# Patient Record
Sex: FEMALE | Employment: OTHER | URBAN - METROPOLITAN AREA
[De-identification: names, ages, dates, MRNs, and addresses within clinical notes are randomized per-mention and may not be internally consistent; named-entity substitution may affect disease eponyms.]

---

## 2019-04-04 NOTE — PATIENT DISCUSSION
Cataracts Glasses Given: It was explained that when the vision no longer meets the patient's needs, and when a new prescription for glasses does not improve visual satisfaction, that the option of cataract surgery is a reasonable next step. It has been determined from manifest refraction and vision testing that a new prescription for glasses may help improve the visual symptoms somewhat but it is not known if they will provide vision that is satisfactory. A new prescription for glasses was provided to the patient and we will re-evaluate in 1 MONTH.

## 2020-10-07 NOTE — PATIENT DISCUSSION
MILD DRY EYES: PRESCRIBED ARTIFICIAL TEARS BID - QID AND WARM COMPRESSES OU RETURN FOR FOLLOW-UP AS SCHEDULED OR SOONER IF SYMPTOMS WORSEN.

## 2020-11-04 NOTE — PATIENT DISCUSSION
Surgery Counseling: I have discussed the option of scheduling surgery versus following, as well as the risks, benefits and alternatives of cataract surgery with the patient. It was explained that the surgery is medically indicated at this time, and it can be performed at the patient's option as delaying will cause no further deterioration, therefore there is no rush and there is no harm in waiting to have surgery. It was also explained that there is no guarantee that removing the cataract will improve their vision. The patient understands and desires to proceed with cataract surgery with the implantation of an intraocular lens to improve vision for ____reading and driving___. I have given the patient the prescribed regimen of the all-in-one drop to use before and after cataract surgery. They have elected to use the all-in-one option of Pred/Gati/Brom(prednisolone acetate,gatifloxacin,and bromfenac. Patient to administer as directed.

## 2020-11-04 NOTE — PATIENT DISCUSSION
CATARACTS, OS - VISUALLY SIGNIFICANT. SCHEDULE OS  DISCUSSED OPTION OF ___STANDARD OS___. PATIENT UNDERSTANDS AND DESIRES __STANDARD OS___.

## 2020-12-30 NOTE — PATIENT DISCUSSION
S/P PE IOL, ou. DOING WELL. CONTINUE DROPS AS DIRECTED. SPECS RX OFFERED. RX ARC IN SPECS TO MINIMIZE GLARE. RETURN FOR FOLLOW-UP AS SCHEDULED.

## 2021-01-25 ENCOUNTER — PREPPED CHART (OUTPATIENT)
Dept: URBAN - METROPOLITAN AREA CLINIC 39 | Facility: CLINIC | Age: 66
End: 2021-01-25

## 2022-01-14 NOTE — PATIENT DISCUSSION
Despite some risk factors, the patient does not demonstrate definitive evidence of glaucoma at this time.
Discussed the importance of blood sugar control in the prevention of ocular complications.
Follow with serial OCTs.
Instructed to call immediately if any new distortion, blurring, decreased vision or eye pain.
Monitor yearly for diabetic eye disease.
Monitor.
OCT ON OU today.
Patient advised to call if any problems, questions, or concerns.
Patient made aware of 24/7 emergency services.
Patient understands condition, prognosis and need for follow up care.
Recommended yearly dilated eye examinations.
Retinal exam findings communicated to Physician managing diabetes.
Retinal tear and detachment warning symptoms reviewed and patient instructed to call immediately if increasing floaters, flashes, or decreasing peripheral vision.
Stable, monitor.
Gastroenterology
Internal Medicine
Gastroenterology
Gastroenterology
Internal Medicine

## 2022-04-22 NOTE — PATIENT DISCUSSION
Glasses Prescribed: Stable on omeprazole 40 mg daily.  Refill sent.  She is following with Dr. Rosas Whitt at [facility] who wants to do a scope and barium swallow, but she is waiting a bit longer on that.  We did discuss today that it would be helpful to go ahead and get these procedures done.

## 2022-11-17 ENCOUNTER — EMERGENCY VISIT (OUTPATIENT)
Dept: URBAN - METROPOLITAN AREA CLINIC 39 | Facility: CLINIC | Age: 67
End: 2022-11-17

## 2022-11-17 DIAGNOSIS — Z96.1: ICD-10-CM

## 2022-11-17 DIAGNOSIS — Z98.890: ICD-10-CM

## 2022-11-17 PROCEDURE — 92012 INTRM OPH EXAM EST PATIENT: CPT

## 2022-11-17 ASSESSMENT — TONOMETRY
OS_IOP_MMHG: 20
OD_IOP_MMHG: 20

## 2022-11-17 ASSESSMENT — VISUAL ACUITY
OS_SC: 20/400
OD_SC: 20/20-1

## 2023-01-19 ENCOUNTER — CONSULTATION/EVALUATION (OUTPATIENT)
Dept: URBAN - METROPOLITAN AREA CLINIC 39 | Facility: CLINIC | Age: 68
End: 2023-01-19

## 2023-01-19 DIAGNOSIS — Z96.1: ICD-10-CM

## 2023-01-19 DIAGNOSIS — Z98.890: ICD-10-CM

## 2023-01-19 DIAGNOSIS — H25.812: ICD-10-CM

## 2023-01-19 PROCEDURE — 92136TC INTERFEROMETRY - TECHNICAL COMPONENT

## 2023-01-19 PROCEDURE — V2799PMN IMPRIMIS PRED-MOXI-NEPAF 5ML

## 2023-01-19 PROCEDURE — 92025 CPTRIZED CORNEAL TOPOGRAPHY: CPT

## 2023-01-19 PROCEDURE — 92014 COMPRE OPH EXAM EST PT 1/>: CPT

## 2023-01-19 ASSESSMENT — VISUAL ACUITY
OD_SC: 20/20-1
OD_SC: J1
OD_BAT: 20/30
OS_SC: 20/400
OS_SC: J8
OS_BAT: 20/70

## 2023-01-19 ASSESSMENT — TONOMETRY
OS_IOP_MMHG: 16
OD_IOP_MMHG: 17

## 2023-04-11 ASSESSMENT — VISUAL ACUITY: OS_AM: 20/60

## 2023-04-14 ENCOUNTER — CONSULTATION/EVALUATION (OUTPATIENT)
Dept: URBAN - METROPOLITAN AREA CLINIC 39 | Facility: CLINIC | Age: 68
End: 2023-04-14

## 2023-06-14 ENCOUNTER — SURGERY/PROCEDURE (OUTPATIENT)
Dept: URBAN - METROPOLITAN AREA SURGERY 14 | Facility: SURGERY | Age: 68
End: 2023-06-14

## 2023-06-14 ENCOUNTER — CONSULTATION/EVALUATION (OUTPATIENT)
Dept: URBAN - METROPOLITAN AREA CLINIC 39 | Facility: CLINIC | Age: 68
End: 2023-06-14

## 2023-06-14 DIAGNOSIS — H26.492: ICD-10-CM

## 2023-06-14 PROCEDURE — 6682154 YAG CAPSULOTOMY(SX ONLY)

## 2023-06-14 PROCEDURE — 92014 COMPRE OPH EXAM EST PT 1/>: CPT

## 2023-06-14 ASSESSMENT — VISUAL ACUITY
OS_SC: 20/25+1
OS_AM: 20/60

## 2023-06-14 ASSESSMENT — TONOMETRY: OS_IOP_MMHG: 17

## 2024-11-05 NOTE — PATIENT DISCUSSION
02/01/2021OS-0.75+2.47553+[id number]/20&nbsp;SN &nbsp; &nbsp; cgw.
Anti-reflective.
Bifocal - Daily wearOD-0.25+1.52741+2.041883/20&nbsp;SN &nbsp; &nbsp; cgw.
COUNSELING:.
Comments: BID.
Comments:any combo,.
Continue: Artificial Tears (dextran 70-hypromellose): drops:.
Dry Eye Syndrome Counseling: I have discussed the diagnosis and the pathophysiology of this disease with the patient. Eyelid pathology and systemic illnesses such as Sjogren?s disease or rheumatoid arthritis may contribute to severity. Vision may be limited by dry eye, and symptoms exacerbated by environmental factors such as smoke, wind, or prolonged eye use. Treatment options include, but are not limited to, artificial tears, punctal plugs, topical cyclosporine, oral omega-3 supplements, Lipiflow, moisture goggles, and lubricating ointments. I stressed the importance of compliance with treatment.
Expiration:02/01/2022.
General:.
Glasses Prescribed:.
Lens Material:.
Lens Treatment:.
MILD DRY EYES: PRESCRIBED OTC ARTIFICIAL TEARS BID - QID, OU RETURN FOR FOLLOW-UP AS SCHEDULED OR SOONER IF SYMPTOMS WORSEN.
Medications:.
Slab Off:No.
UV Protection.
Vertex Distance:.
new changes w/ recheck.
spherecylinderaxisaddprismvertexVAInt VANVExaminer.
Placenta increta